# Patient Record
Sex: MALE | Race: WHITE
[De-identification: names, ages, dates, MRNs, and addresses within clinical notes are randomized per-mention and may not be internally consistent; named-entity substitution may affect disease eponyms.]

---

## 2017-05-21 ENCOUNTER — CHARTING TRANS (OUTPATIENT)
Dept: OTHER | Age: 52
End: 2017-05-21

## 2017-06-03 ENCOUNTER — CHARTING TRANS (OUTPATIENT)
Dept: OTHER | Age: 52
End: 2017-06-03

## 2017-07-14 ENCOUNTER — LAB ENCOUNTER (OUTPATIENT)
Dept: LAB | Age: 52
End: 2017-07-14
Attending: NURSE PRACTITIONER
Payer: COMMERCIAL

## 2017-07-14 ENCOUNTER — OFFICE VISIT (OUTPATIENT)
Dept: INTERNAL MEDICINE CLINIC | Facility: CLINIC | Age: 52
End: 2017-07-14

## 2017-07-14 VITALS
HEIGHT: 72 IN | WEIGHT: 213 LBS | BODY MASS INDEX: 28.85 KG/M2 | TEMPERATURE: 98 F | DIASTOLIC BLOOD PRESSURE: 68 MMHG | SYSTOLIC BLOOD PRESSURE: 110 MMHG | HEART RATE: 58 BPM

## 2017-07-14 DIAGNOSIS — Z13.0 SCREENING FOR BLOOD DISEASE: ICD-10-CM

## 2017-07-14 DIAGNOSIS — Z13.228 SCREENING FOR METABOLIC DISORDER: ICD-10-CM

## 2017-07-14 DIAGNOSIS — Z12.5 SCREENING FOR PROSTATE CANCER: ICD-10-CM

## 2017-07-14 DIAGNOSIS — Z13.29 SCREENING FOR THYROID DISORDER: ICD-10-CM

## 2017-07-14 DIAGNOSIS — Z12.11 SCREENING FOR COLON CANCER: ICD-10-CM

## 2017-07-14 DIAGNOSIS — Q23.1 BICUSPID AORTIC VALVE DETERMINED BY IMAGING: ICD-10-CM

## 2017-07-14 DIAGNOSIS — Z13.220 SCREENING FOR LIPID DISORDERS: ICD-10-CM

## 2017-07-14 DIAGNOSIS — Z00.00 ROUTINE PHYSICAL EXAMINATION: Primary | ICD-10-CM

## 2017-07-14 DIAGNOSIS — Q23.1 AORTIC INSUFFICIENCY DUE TO BICUSPID AORTIC VALVE: ICD-10-CM

## 2017-07-14 PROBLEM — Q23.81 AORTIC INSUFFICIENCY DUE TO BICUSPID AORTIC VALVE (HCC): Status: ACTIVE | Noted: 2017-07-14

## 2017-07-14 PROBLEM — I35.1 AORTIC VALVE REGURGITATION: Status: ACTIVE | Noted: 2017-07-14

## 2017-07-14 PROBLEM — Q23.81 BICUSPID AORTIC VALVE DETERMINED BY IMAGING: Status: ACTIVE | Noted: 2017-07-14

## 2017-07-14 LAB
ALBUMIN SERPL-MCNC: 4 G/DL (ref 3.5–4.8)
ALP LIVER SERPL-CCNC: 62 U/L (ref 45–117)
ALT SERPL-CCNC: 28 U/L (ref 17–63)
AST SERPL-CCNC: 12 U/L (ref 15–41)
BASOPHILS # BLD AUTO: 0.06 X10(3) UL (ref 0–0.1)
BASOPHILS NFR BLD AUTO: 1.2 %
BILIRUB SERPL-MCNC: 1.5 MG/DL (ref 0.1–2)
BUN BLD-MCNC: 15 MG/DL (ref 8–20)
CALCIUM BLD-MCNC: 9.3 MG/DL (ref 8.3–10.3)
CHLORIDE: 109 MMOL/L (ref 101–111)
CHOLEST SMN-MCNC: 208 MG/DL (ref ?–200)
CO2: 27 MMOL/L (ref 22–32)
CREAT BLD-MCNC: 1.08 MG/DL (ref 0.7–1.3)
EOSINOPHIL # BLD AUTO: 0.12 X10(3) UL (ref 0–0.3)
EOSINOPHIL NFR BLD AUTO: 2.3 %
ERYTHROCYTE [DISTWIDTH] IN BLOOD BY AUTOMATED COUNT: 13.7 % (ref 11.5–16)
GLUCOSE BLD-MCNC: 104 MG/DL (ref 70–99)
HCT VFR BLD AUTO: 46 % (ref 37–53)
HDLC SERPL-MCNC: 56 MG/DL (ref 45–?)
HDLC SERPL: 3.71 {RATIO} (ref ?–4.97)
HGB BLD-MCNC: 15.3 G/DL (ref 13–17)
IMMATURE GRANULOCYTE COUNT: 0.02 X10(3) UL (ref 0–1)
IMMATURE GRANULOCYTE RATIO %: 0.4 %
LDLC SERPL CALC-MCNC: 128 MG/DL (ref ?–130)
LYMPHOCYTES # BLD AUTO: 1.81 X10(3) UL (ref 0.9–4)
LYMPHOCYTES NFR BLD AUTO: 35.1 %
M PROTEIN MFR SERPL ELPH: 7 G/DL (ref 6.1–8.3)
MCH RBC QN AUTO: 29.4 PG (ref 27–33.2)
MCHC RBC AUTO-ENTMCNC: 33.3 G/DL (ref 31–37)
MCV RBC AUTO: 88.3 FL (ref 80–99)
MONOCYTES # BLD AUTO: 0.44 X10(3) UL (ref 0.1–0.6)
MONOCYTES NFR BLD AUTO: 8.5 %
NEUTROPHIL ABS PRELIM: 2.71 X10 (3) UL (ref 1.3–6.7)
NEUTROPHILS # BLD AUTO: 2.71 X10(3) UL (ref 1.3–6.7)
NEUTROPHILS NFR BLD AUTO: 52.5 %
NONHDLC SERPL-MCNC: 152 MG/DL (ref ?–130)
PLATELET # BLD AUTO: 202 10(3)UL (ref 150–450)
POTASSIUM SERPL-SCNC: 5.2 MMOL/L (ref 3.6–5.1)
PSA SERPL-MCNC: 0.75 NG/ML (ref 0.01–4)
RBC # BLD AUTO: 5.21 X10(6)UL (ref 4.3–5.7)
RED CELL DISTRIBUTION WIDTH-SD: 44.4 FL (ref 35.1–46.3)
SODIUM SERPL-SCNC: 142 MMOL/L (ref 136–144)
TRIGLYCERIDES: 119 MG/DL (ref ?–150)
TSI SER-ACNC: 0.41 MIU/ML (ref 0.35–5.5)
VLDL: 24 MG/DL (ref 5–40)
WBC # BLD AUTO: 5.2 X10(3) UL (ref 4–13)

## 2017-07-14 PROCEDURE — 84153 ASSAY OF PSA TOTAL: CPT | Performed by: NURSE PRACTITIONER

## 2017-07-14 PROCEDURE — 80061 LIPID PANEL: CPT | Performed by: NURSE PRACTITIONER

## 2017-07-14 PROCEDURE — 99386 PREV VISIT NEW AGE 40-64: CPT | Performed by: NURSE PRACTITIONER

## 2017-07-14 PROCEDURE — 80050 GENERAL HEALTH PANEL: CPT | Performed by: NURSE PRACTITIONER

## 2017-07-14 RX ORDER — ATORVASTATIN CALCIUM 10 MG/1
10 TABLET, FILM COATED ORAL NIGHTLY
Qty: 30 TABLET | Refills: 2 | Status: SHIPPED | OUTPATIENT
Start: 2017-07-14 | End: 2018-05-02

## 2017-07-20 DIAGNOSIS — E87.5 HYPERKALEMIA: Primary | ICD-10-CM

## 2017-08-08 ENCOUNTER — APPOINTMENT (OUTPATIENT)
Dept: LAB | Age: 52
End: 2017-08-08
Attending: NURSE PRACTITIONER
Payer: COMMERCIAL

## 2017-08-08 DIAGNOSIS — E87.5 HYPERKALEMIA: ICD-10-CM

## 2017-08-08 LAB
BUN BLD-MCNC: 15 MG/DL (ref 8–20)
CALCIUM BLD-MCNC: 9.3 MG/DL (ref 8.3–10.3)
CHLORIDE: 108 MMOL/L (ref 101–111)
CO2: 27 MMOL/L (ref 22–32)
CREAT BLD-MCNC: 1.1 MG/DL (ref 0.7–1.3)
GLUCOSE BLD-MCNC: 97 MG/DL (ref 70–99)
POTASSIUM SERPL-SCNC: 4.1 MMOL/L (ref 3.6–5.1)
SODIUM SERPL-SCNC: 140 MMOL/L (ref 136–144)

## 2017-08-08 PROCEDURE — 80048 BASIC METABOLIC PNL TOTAL CA: CPT | Performed by: NURSE PRACTITIONER

## 2017-08-08 PROCEDURE — 36415 COLL VENOUS BLD VENIPUNCTURE: CPT | Performed by: NURSE PRACTITIONER

## 2017-08-17 ENCOUNTER — HOSPITAL ENCOUNTER (OUTPATIENT)
Dept: CV DIAGNOSTICS | Facility: HOSPITAL | Age: 52
Discharge: HOME | End: 2017-08-17
Attending: NURSE PRACTITIONER
Payer: COMMERCIAL

## 2017-08-17 ENCOUNTER — TELEPHONE (OUTPATIENT)
Dept: INTERNAL MEDICINE CLINIC | Facility: CLINIC | Age: 52
End: 2017-08-17

## 2017-08-17 DIAGNOSIS — Q23.1 AORTIC INSUFFICIENCY DUE TO BICUSPID AORTIC VALVE: ICD-10-CM

## 2017-08-17 DIAGNOSIS — Q23.1 BICUSPID AORTIC VALVE DETERMINED BY IMAGING: ICD-10-CM

## 2017-08-17 PROCEDURE — 93306 TTE W/DOPPLER COMPLETE: CPT | Performed by: NURSE PRACTITIONER

## 2017-08-17 NOTE — TELEPHONE ENCOUNTER
Patient had a 2D Doppler today and states he needs the results of this to be faxed to [facility] by 8/27.

## 2017-08-22 ENCOUNTER — TELEPHONE (OUTPATIENT)
Dept: INTERNAL MEDICINE CLINIC | Facility: CLINIC | Age: 52
End: 2017-08-22

## 2017-10-02 ENCOUNTER — MED REC SCAN ONLY (OUTPATIENT)
Dept: INTERNAL MEDICINE CLINIC | Facility: CLINIC | Age: 52
End: 2017-10-02

## 2017-12-31 ENCOUNTER — HOSPITAL ENCOUNTER (EMERGENCY)
Facility: HOSPITAL | Age: 52
Discharge: HOME | End: 2017-12-31
Attending: EMERGENCY MEDICINE
Payer: COMMERCIAL

## 2017-12-31 VITALS
SYSTOLIC BLOOD PRESSURE: 127 MMHG | RESPIRATION RATE: 16 BRPM | BODY MASS INDEX: 27.77 KG/M2 | HEIGHT: 72 IN | OXYGEN SATURATION: 98 % | WEIGHT: 205 LBS | TEMPERATURE: 97 F | DIASTOLIC BLOOD PRESSURE: 79 MMHG | HEART RATE: 66 BPM

## 2017-12-31 DIAGNOSIS — S01.81XA FACIAL LACERATION, INITIAL ENCOUNTER: ICD-10-CM

## 2017-12-31 DIAGNOSIS — S09.90XA INJURY OF HEAD, INITIAL ENCOUNTER: Primary | ICD-10-CM

## 2017-12-31 PROCEDURE — 90471 IMMUNIZATION ADMIN: CPT

## 2017-12-31 PROCEDURE — 99283 EMERGENCY DEPT VISIT LOW MDM: CPT

## 2017-12-31 PROCEDURE — 12011 RPR F/E/E/N/L/M 2.5 CM/<: CPT

## 2017-12-31 NOTE — ED NOTES
Sterile dressing with neosporin applied, pct did asepsis to wound prio to suturing, pt refusing ct suggested by md, pt has no complaints, a/o x3, bleeding controlled, vss

## 2017-12-31 NOTE — ED INITIAL ASSESSMENT (HPI)
Pt presents with lac to L eyebrow, felt dizzy, slipped in garage hitting head on compreeor, dizziness passed after few seconds, wife stated that pt was tremoring and disoriented needing to sit down

## 2017-12-31 NOTE — ED PROVIDER NOTES
Patient Seen in: [facility] Emergency Department    History   Patient presents with:  Contusion (musculoskeletal)    Stated Complaint: fall    HPI    58-year-old male comes in the hospital she complained of having a laceration to left eyebrow.   The neck supple, no JVD, trachea midline, No LAD  Heart: S1S2 normal. No murmurs, regular rate and rhythm  Lungs: Clear to auscultation bilaterally  Abdomen: Soft nontender nondistended normal active bowel sounds without rebound, guarding or masses noted  Back

## 2018-01-07 ENCOUNTER — HOSPITAL ENCOUNTER (EMERGENCY)
Facility: HOSPITAL | Age: 53
Discharge: HOME | End: 2018-01-07
Attending: EMERGENCY MEDICINE
Payer: COMMERCIAL

## 2018-01-07 VITALS
SYSTOLIC BLOOD PRESSURE: 118 MMHG | HEART RATE: 75 BPM | RESPIRATION RATE: 16 BRPM | DIASTOLIC BLOOD PRESSURE: 70 MMHG | WEIGHT: 205 LBS | BODY MASS INDEX: 27.77 KG/M2 | TEMPERATURE: 98 F | HEIGHT: 72 IN | OXYGEN SATURATION: 97 %

## 2018-01-07 DIAGNOSIS — Z48.02 ENCOUNTER FOR REMOVAL OF SUTURES: Primary | ICD-10-CM

## 2018-01-07 NOTE — ED INITIAL ASSESSMENT (HPI)
Pt was seen here 1 week ago and had mechanical fall obtaining small laceration to L orbit. Pt had 4 sutures placed and was instructed to have them removed in 7 days. Pt here for suture removal. Denies any other complaints.  Wound appears to be healing well

## 2018-01-07 NOTE — ED PROVIDER NOTES
Patient Seen in: [facility] Emergency Department    History   Patient presents with:  Astrid Diaz (ingtegumentary)    Stated Complaint: SUTURE REMOVAL    HPI    Patient reports for suture removal.  Patient had sutures placed on New Year's E 1455  ------------------------------------------------------------       MDM   4 sutures were removed. 1 of the sutures was difficult to remove and I was assisted by my colleague. The wound edges were noted to be well approximated.   There is an abrasion

## 2018-05-02 ENCOUNTER — OFFICE VISIT (OUTPATIENT)
Dept: FAMILY MEDICINE CLINIC | Facility: CLINIC | Age: 53
End: 2018-05-02

## 2018-05-02 VITALS
HEIGHT: 72 IN | DIASTOLIC BLOOD PRESSURE: 72 MMHG | BODY MASS INDEX: 28.85 KG/M2 | RESPIRATION RATE: 18 BRPM | WEIGHT: 213 LBS | OXYGEN SATURATION: 98 % | HEART RATE: 74 BPM | SYSTOLIC BLOOD PRESSURE: 126 MMHG | TEMPERATURE: 98 F

## 2018-05-02 DIAGNOSIS — J06.9 VIRAL URI: Primary | ICD-10-CM

## 2018-05-02 PROCEDURE — 99213 OFFICE O/P EST LOW 20 MIN: CPT | Performed by: NURSE PRACTITIONER

## 2018-05-02 RX ORDER — FLUTICASONE PROPIONATE 50 MCG
2 SPRAY, SUSPENSION (ML) NASAL DAILY
Qty: 1 BOTTLE | Refills: 0 | Status: SHIPPED | OUTPATIENT
Start: 2018-05-02 | End: 2019-08-06

## 2018-05-02 RX ORDER — AMOXICILLIN AND CLAVULANATE POTASSIUM 875; 125 MG/1; MG/1
1 TABLET, FILM COATED ORAL 2 TIMES DAILY
Qty: 20 TABLET | Refills: 0 | Status: SHIPPED | OUTPATIENT
Start: 2018-05-02 | End: 2018-05-12

## 2018-05-02 NOTE — PROGRESS NOTES
CHIEF COMPLAINT:   Patient presents with:  Sinus Problem: sinus congestion, cough, sore throat x4 days (no meds taken)      HPI:   Wendi Green is a 48year old male who presents for upper respiratory symptoms for  4 days.  Patient reports sore throat, conge THROAT: Oral mucosa pink, moist. Posterior pharynx is starr erythematous. no exudates. Tonsils 1/4. +PND. NECK: Supple, non-tender  LUNGS: clear to auscultation bilaterally, no wheezes or rhonchi. Breathing is non labored.   CARDIO: RRR without murmur  EX You have a viral upper respiratory illness (URI), which is another term for the common cold. This illness is contagious during the first few days. It is spread through the air by coughing and sneezing.  It may also be spread by direct contact (touching the · Cough with lots of colored sputum (mucus)  · Severe headache; face, neck, or ear pain  · Difficulty swallowing due to throat pain  · Fever of 100.4°F (38°C) or higher, or as directed by your healthcare provider  Call 911  Call 911 if any of these occur: · Over-the-counter decongestants may be used unless a similar medicine was prescribed. Nasal sprays work the fastest. Use one that contains phenylephrine or oxymetazoline. First blow the nose gently. Then use the spray.  Do not use these medicines more ofte © 4346-3734 The [address] [facility], [facility] [place]. All rights reserved. This information is not intended as a substitute for professional medical care. Always follow your healthcare professional's instructions.             The

## 2018-05-02 NOTE — PATIENT INSTRUCTIONS
Take the nasal spray as prescribed. You can also try over the counter mucinex for congestion. Try warm salt water gargles for sore throat. Fill the antibiotic prescription only if your symptoms do not improve after another 4-5 days.    Viral Upper Respira · Over-the-counter cold medicines will not shorten the length of time you’re sick, but they may be helpful for the following symptoms: cough, sore throat, and nasal and sinus congestion.  (Note: Do not use decongestants if you have high blood pressure.)  Fo · Heat may help soothe painful areas of the face. Use a towel soaked in hot water. Or,  the shower and direct the hot spray onto your face.  Using a vaporizer along with a menthol rub at night may also help.   · An expectorant containing guaifenesin · Vision problems, including blurred or double vision  · Fever of 100.4ºF (38ºC) or higher, or as directed by your healthcare provider  · Seizure  · Breathing problems  · Symptoms not resolving within 10 days  Date Last Reviewed: 4/13/2015  © 6344-7827 The

## 2018-09-06 ENCOUNTER — OFFICE VISIT (OUTPATIENT)
Dept: FAMILY MEDICINE CLINIC | Facility: CLINIC | Age: 53
End: 2018-09-06
Payer: COMMERCIAL

## 2018-09-06 VITALS
BODY MASS INDEX: 29 KG/M2 | HEART RATE: 87 BPM | WEIGHT: 211 LBS | RESPIRATION RATE: 16 BRPM | OXYGEN SATURATION: 98 % | SYSTOLIC BLOOD PRESSURE: 120 MMHG | DIASTOLIC BLOOD PRESSURE: 80 MMHG | TEMPERATURE: 99 F

## 2018-09-06 DIAGNOSIS — J06.9 URI, ACUTE: Primary | ICD-10-CM

## 2018-09-06 DIAGNOSIS — B34.9 ACUTE VIRAL SYNDROME: ICD-10-CM

## 2018-09-06 PROCEDURE — 99213 OFFICE O/P EST LOW 20 MIN: CPT | Performed by: NURSE PRACTITIONER

## 2018-09-09 NOTE — PROGRESS NOTES
Patient presents with:  URI: post nasal drip and cough sx x 2-3 days. HPI:   Danika Ospina is a 48year old male who presents for upper respiratory symptoms for  2  days.  Patient reports sore throat only at the beginning of sx's, congestion, clear colo auscultation  CARDIO: RRR without murmur  GI: good BS's,no masses, HSM or tenderness    ASSESSMENT AND PLAN:   Michelle Young is a 48year old male who presents with:    Nunez Pavy, acute  (primary encounter diagnosis)  Acute viral syndrome      Meds & Refills for t

## 2018-09-17 ENCOUNTER — OFFICE VISIT (OUTPATIENT)
Dept: INTERNAL MEDICINE CLINIC | Facility: CLINIC | Age: 53
End: 2018-09-17
Payer: COMMERCIAL

## 2018-09-17 VITALS
SYSTOLIC BLOOD PRESSURE: 120 MMHG | HEIGHT: 72 IN | HEART RATE: 104 BPM | DIASTOLIC BLOOD PRESSURE: 70 MMHG | WEIGHT: 212 LBS | RESPIRATION RATE: 16 BRPM | OXYGEN SATURATION: 96 % | TEMPERATURE: 97 F | BODY MASS INDEX: 28.71 KG/M2

## 2018-09-17 DIAGNOSIS — J06.9 ACUTE URI: Primary | ICD-10-CM

## 2018-09-17 DIAGNOSIS — R05.9 COUGH: ICD-10-CM

## 2018-09-17 PROCEDURE — 99213 OFFICE O/P EST LOW 20 MIN: CPT | Performed by: NURSE PRACTITIONER

## 2018-09-17 RX ORDER — CODEINE PHOSPHATE AND GUAIFENESIN 10; 100 MG/5ML; MG/5ML
10 SOLUTION ORAL NIGHTLY PRN
Qty: 240 ML | Refills: 0 | Status: SHIPPED | OUTPATIENT
Start: 2018-09-17 | End: 2018-09-28

## 2018-09-17 RX ORDER — AMOXICILLIN AND CLAVULANATE POTASSIUM 875; 125 MG/1; MG/1
1 TABLET, FILM COATED ORAL 2 TIMES DAILY
Qty: 20 TABLET | Refills: 0 | Status: SHIPPED | OUTPATIENT
Start: 2018-09-17 | End: 2018-09-27

## 2018-09-17 NOTE — PROGRESS NOTES
Denisse Teixeira is a 48year old male. Patient presents with:  Cold: patient c/o headaches, cough attacks x 1 week      HPI:   Presents for eval of cough and nasal congestion. Started 1 week ago. Yellow green nasal discharge and coughing up same.   No fever 96%   BMI 28.75 kg/m²   GENERAL: well developed, well nourished,in no apparent distress  HEENT: atraumatic, normocephalic,pharyngeal erythema without exudate. Maxillary and frontal sinus tenderness.     NECK: supple,no adenopathy,  LUNGS: normal rate witho

## 2018-09-26 NOTE — TELEPHONE ENCOUNTER
LOV:9/17/18 SD  FOV:none on file   LAST RX:9/17/18 100-10 mg/5ml take 10 ml nightly as needed for cough 240 ml 0 refills   LAST LABS:8/8/17 bmp  PER PROTOCOL: to provider

## 2018-09-28 RX ORDER — CODEINE PHOSPHATE AND GUAIFENESIN 10; 100 MG/5ML; MG/5ML
10 SOLUTION ORAL NIGHTLY PRN
Qty: 120 ML | Refills: 0 | Status: SHIPPED | OUTPATIENT
Start: 2018-09-28 | End: 2018-11-27

## 2018-09-28 RX ORDER — OXYBUTYNIN CHLORIDE 5 MG/1
TABLET ORAL
Qty: 240 ML | Refills: 0 | OUTPATIENT
Start: 2018-09-28

## 2018-09-28 NOTE — TELEPHONE ENCOUNTER
Spoke with patient states continues to experience coughing along with minimal yellow green nasal discharge, post nasal drip, headaches behind right eye when coughing only. No fevers, No chills, No other symptoms at this time.  Patient completed antibiotic c

## 2018-09-28 NOTE — TELEPHONE ENCOUNTER
Spoke with patient informed SD sent rx for guaifenesin-codeine supply for the weekend, if he is not feeling better next week, will need to come back for OV. Patient verbalized understanding and agreeable to POC.    Rx faxed to [facility] pharmacy on file, fa

## 2018-11-03 VITALS
HEART RATE: 52 BPM | DIASTOLIC BLOOD PRESSURE: 78 MMHG | WEIGHT: 205 LBS | HEIGHT: 72 IN | SYSTOLIC BLOOD PRESSURE: 124 MMHG | TEMPERATURE: 98.3 F | BODY MASS INDEX: 27.77 KG/M2 | RESPIRATION RATE: 16 BRPM

## 2018-11-03 VITALS
HEART RATE: 84 BPM | SYSTOLIC BLOOD PRESSURE: 130 MMHG | WEIGHT: 205 LBS | DIASTOLIC BLOOD PRESSURE: 82 MMHG | TEMPERATURE: 98.1 F | RESPIRATION RATE: 16 BRPM

## 2018-11-27 ENCOUNTER — MED REC SCAN ONLY (OUTPATIENT)
Dept: FAMILY MEDICINE CLINIC | Facility: CLINIC | Age: 53
End: 2018-11-27

## 2018-11-27 ENCOUNTER — OFFICE VISIT (OUTPATIENT)
Dept: FAMILY MEDICINE CLINIC | Facility: CLINIC | Age: 53
End: 2018-11-27
Payer: COMMERCIAL

## 2018-11-27 VITALS
TEMPERATURE: 98 F | OXYGEN SATURATION: 98 % | HEIGHT: 72 IN | BODY MASS INDEX: 28.71 KG/M2 | SYSTOLIC BLOOD PRESSURE: 122 MMHG | RESPIRATION RATE: 16 BRPM | WEIGHT: 212 LBS | DIASTOLIC BLOOD PRESSURE: 80 MMHG | HEART RATE: 86 BPM

## 2018-11-27 DIAGNOSIS — J01.00 ACUTE NON-RECURRENT MAXILLARY SINUSITIS: ICD-10-CM

## 2018-11-27 DIAGNOSIS — H10.32 ACUTE BACTERIAL CONJUNCTIVITIS OF LEFT EYE: Primary | ICD-10-CM

## 2018-11-27 PROCEDURE — 99213 OFFICE O/P EST LOW 20 MIN: CPT | Performed by: NURSE PRACTITIONER

## 2018-11-27 RX ORDER — TOBRAMYCIN 3 MG/ML
SOLUTION/ DROPS OPHTHALMIC
Qty: 10 ML | Refills: 1 | Status: SHIPPED | OUTPATIENT
Start: 2018-11-27 | End: 2019-08-06 | Stop reason: ALTCHOICE

## 2018-11-27 RX ORDER — AMOXICILLIN AND CLAVULANATE POTASSIUM 875; 125 MG/1; MG/1
1 TABLET, FILM COATED ORAL 2 TIMES DAILY
Qty: 20 TABLET | Refills: 0 | Status: SHIPPED | OUTPATIENT
Start: 2018-11-27 | End: 2018-11-27

## 2018-11-27 RX ORDER — AMOXICILLIN AND CLAVULANATE POTASSIUM 875; 125 MG/1; MG/1
1 TABLET, FILM COATED ORAL 2 TIMES DAILY
Qty: 20 TABLET | Refills: 0 | Status: SHIPPED | OUTPATIENT
Start: 2018-11-27 | End: 2018-12-07

## 2018-11-27 NOTE — PROGRESS NOTES
CHIEF COMPLAINT:   \" Patient presents with:  Eye Problem: left eye redness,painful,discharge and change in vision sx x 1 day. Vision R.20/25  L.20/20 w/o aid. HPI:   Andra Gonzales is a 48year old male who presents with a hx of a Sinus infection.   Pt w Family History   Problem Relation Age of Onset   • Hypertension Father    • Heart Disorder Paternal Grandmother    • Heart Disorder Paternal Grandfather       Social History    Tobacco Use      Smoking status: Current Some Day Smoker        Types: Cigars Acute bacterial conjunctivitis of left eye    - Tobramycin Sulfate 0.3 % Ophthalmic Solution;  Instill 2 drops to affected eye(s) every 2 hours while awake today, then instill 2 drops to affected eye(s) four times a day for 6 more days  Dispense: 10 mL; Ref

## 2019-08-06 ENCOUNTER — OFFICE VISIT (OUTPATIENT)
Dept: INTERNAL MEDICINE CLINIC | Facility: CLINIC | Age: 54
End: 2019-08-06
Payer: COMMERCIAL

## 2019-08-06 VITALS
HEART RATE: 55 BPM | BODY MASS INDEX: 28.17 KG/M2 | DIASTOLIC BLOOD PRESSURE: 74 MMHG | SYSTOLIC BLOOD PRESSURE: 106 MMHG | TEMPERATURE: 98 F | RESPIRATION RATE: 16 BRPM | WEIGHT: 208 LBS | HEIGHT: 72 IN

## 2019-08-06 DIAGNOSIS — M62.830 LUMBAR PARASPINAL MUSCLE SPASM: Primary | ICD-10-CM

## 2019-08-06 DIAGNOSIS — M54.16 LUMBAR RADICULOPATHY: ICD-10-CM

## 2019-08-06 PROCEDURE — 99213 OFFICE O/P EST LOW 20 MIN: CPT | Performed by: INTERNAL MEDICINE

## 2019-08-06 RX ORDER — HYDROCODONE BITARTRATE AND ACETAMINOPHEN 5; 325 MG/1; MG/1
1 TABLET ORAL EVERY 8 HOURS PRN
Qty: 5 TABLET | Refills: 0 | Status: SHIPPED | OUTPATIENT
Start: 2019-08-06 | End: 2019-10-30 | Stop reason: ALTCHOICE

## 2019-08-06 RX ORDER — CYCLOBENZAPRINE HCL 10 MG
10 TABLET ORAL 2 TIMES DAILY PRN
Qty: 20 TABLET | Refills: 1 | Status: SHIPPED | OUTPATIENT
Start: 2019-08-06 | End: 2019-08-16

## 2019-08-06 RX ORDER — METHYLPREDNISOLONE 4 MG/1
TABLET ORAL
Qty: 1 KIT | Refills: 0 | Status: SHIPPED | OUTPATIENT
Start: 2019-08-06 | End: 2019-10-30 | Stop reason: ALTCHOICE

## 2019-08-06 NOTE — PROGRESS NOTES
Sana Marlow  2/6/1965    Patient presents with:  Back Pain: x 1 wk, mid back, believes to have \"aggrivated\" it      SUBJECTIVE   Sana Marlow is a 47year old male who presents with lower back pain.     The patient describes a 7-day history of bilateral lo Binge frequency: Never      Comment: CAGE 8/6/19    Drug use: No        OBJECTIVE:   /74 (BP Location: Right arm, Patient Position: Sitting, Cuff Size: adult)   Pulse 55   Temp 98 °F (36.7 °C) (Oral)   Resp 16   Ht 72\"   Wt 208 lb   BMI 28.21 kg/m therapy  We will hold imaging at this time given lack of trauma    The patient indicates understanding of these issues and agrees to the plan. The patient is asked to return or present to the emergency room for worsening of symptoms.     TODAY'S ORDERS

## 2019-10-16 ENCOUNTER — TELEPHONE (OUTPATIENT)
Dept: INTERNAL MEDICINE CLINIC | Facility: CLINIC | Age: 54
End: 2019-10-16

## 2019-10-16 DIAGNOSIS — Z12.5 SCREENING FOR MALIGNANT NEOPLASM OF PROSTATE: ICD-10-CM

## 2019-10-16 DIAGNOSIS — Z13.29 SCREENING FOR THYROID DISORDER: ICD-10-CM

## 2019-10-16 DIAGNOSIS — Z13.0 SCREENING FOR DISORDER OF BLOOD AND BLOOD-FORMING ORGANS: ICD-10-CM

## 2019-10-16 DIAGNOSIS — Z13.220 SCREENING FOR LIPID DISORDERS: ICD-10-CM

## 2019-10-16 DIAGNOSIS — Z00.00 ROUTINE GENERAL MEDICAL EXAMINATION AT A HEALTH CARE FACILITY: Primary | ICD-10-CM

## 2019-10-16 DIAGNOSIS — Z13.228 SCREENING FOR METABOLIC DISORDER: ICD-10-CM

## 2019-10-16 NOTE — TELEPHONE ENCOUNTER
Future Appointments   Date Time Provider Jacques Hidalgo   10/30/2019  8:00 AM Bridgett Murray, APRN [facility]     Pt would like fasting BW orders sent to [facility].

## 2019-10-18 ENCOUNTER — TELEPHONE (OUTPATIENT)
Dept: INTERNAL MEDICINE CLINIC | Facility: CLINIC | Age: 54
End: 2019-10-18

## 2019-10-18 NOTE — TELEPHONE ENCOUNTER
Pt dropped off forms for foster license. Please contact pt upon completion for . Pt is scheduled for date below, can pick it up then as well. Please advise. Will place copy in front folder and one in the back.     Future Appointments   Date Time

## 2019-10-21 NOTE — TELEPHONE ENCOUNTER
Last ov 9/2018  Last CPE 2017  Will not be able to complete forms until after CPE.   Please inform patient they will not be completed for him to take with at CPE

## 2019-10-30 ENCOUNTER — OFFICE VISIT (OUTPATIENT)
Dept: INTERNAL MEDICINE CLINIC | Facility: CLINIC | Age: 54
End: 2019-10-30
Payer: COMMERCIAL

## 2019-10-30 VITALS
SYSTOLIC BLOOD PRESSURE: 102 MMHG | BODY MASS INDEX: 28.04 KG/M2 | DIASTOLIC BLOOD PRESSURE: 60 MMHG | WEIGHT: 207 LBS | HEART RATE: 68 BPM | TEMPERATURE: 98 F | HEIGHT: 72 IN

## 2019-10-30 DIAGNOSIS — Z23 NEEDS FLU SHOT: ICD-10-CM

## 2019-10-30 DIAGNOSIS — Q23.1 BICUSPID AORTIC VALVE DETERMINED BY IMAGING: ICD-10-CM

## 2019-10-30 DIAGNOSIS — Z00.00 ROUTINE GENERAL MEDICAL EXAMINATION AT A HEALTH CARE FACILITY: Primary | ICD-10-CM

## 2019-10-30 DIAGNOSIS — Q23.1 AORTIC INSUFFICIENCY DUE TO BICUSPID AORTIC VALVE: ICD-10-CM

## 2019-10-30 PROBLEM — H10.32 ACUTE BACTERIAL CONJUNCTIVITIS OF LEFT EYE: Status: RESOLVED | Noted: 2018-11-27 | Resolved: 2019-10-30

## 2019-10-30 PROCEDURE — 86580 TB INTRADERMAL TEST: CPT | Performed by: NURSE PRACTITIONER

## 2019-10-30 PROCEDURE — 90686 IIV4 VACC NO PRSV 0.5 ML IM: CPT | Performed by: NURSE PRACTITIONER

## 2019-10-30 PROCEDURE — 99396 PREV VISIT EST AGE 40-64: CPT | Performed by: NURSE PRACTITIONER

## 2019-10-30 PROCEDURE — 90471 IMMUNIZATION ADMIN: CPT | Performed by: NURSE PRACTITIONER

## 2019-10-30 NOTE — PROGRESS NOTES
Robin Tran is a 47year old male who presents for a complete physical exam.     HPI:   Pt complains of . 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 Feeling good overall.     Noted with prior hi Alcohol/week: 0.0 standard drinks      Frequency: Monthly or less      Drinks per session: 1 or 2      Binge frequency: Never      Comment: CAGE 8/6/19    Drug use: No     Social History: Occ: working . : yes . [employer]cd[employer]   Exercise: minimal.  Diet: watches mi descended testes,no masses,no hernia,no penile lesions  RECTAL: prostate not enlarged.  no mass  MUSCULOSKELETAL: back is not tender  EXTREMITIES: no edema  NEURO: Oriented times three,cranial nerves are intact,motor and sensory are grossly intact    ASSESS

## 2019-11-01 ENCOUNTER — NURSE ONLY (OUTPATIENT)
Dept: INTERNAL MEDICINE CLINIC | Facility: CLINIC | Age: 54
End: 2019-11-01
Payer: COMMERCIAL

## 2019-11-01 ENCOUNTER — LAB ENCOUNTER (OUTPATIENT)
Dept: LAB | Age: 54
End: 2019-11-01
Attending: NURSE PRACTITIONER
Payer: COMMERCIAL

## 2019-11-01 DIAGNOSIS — Z12.5 SCREENING FOR MALIGNANT NEOPLASM OF PROSTATE: ICD-10-CM

## 2019-11-01 DIAGNOSIS — Z13.29 SCREENING FOR THYROID DISORDER: ICD-10-CM

## 2019-11-01 DIAGNOSIS — R73.9 HYPERGLYCEMIA: ICD-10-CM

## 2019-11-01 DIAGNOSIS — Z13.0 SCREENING FOR DISORDER OF BLOOD AND BLOOD-FORMING ORGANS: ICD-10-CM

## 2019-11-01 DIAGNOSIS — R73.9 HYPERGLYCEMIA: Primary | ICD-10-CM

## 2019-11-01 DIAGNOSIS — Z13.220 SCREENING FOR LIPID DISORDERS: ICD-10-CM

## 2019-11-01 DIAGNOSIS — Z13.228 SCREENING FOR METABOLIC DISORDER: ICD-10-CM

## 2019-11-01 DIAGNOSIS — Z00.00 ROUTINE GENERAL MEDICAL EXAMINATION AT A HEALTH CARE FACILITY: ICD-10-CM

## 2019-11-01 PROCEDURE — 80050 GENERAL HEALTH PANEL: CPT | Performed by: NURSE PRACTITIONER

## 2019-11-01 PROCEDURE — 80061 LIPID PANEL: CPT | Performed by: NURSE PRACTITIONER

## 2019-11-01 PROCEDURE — 84153 ASSAY OF PSA TOTAL: CPT | Performed by: NURSE PRACTITIONER

## 2019-11-01 PROCEDURE — 83036 HEMOGLOBIN GLYCOSYLATED A1C: CPT | Performed by: NURSE PRACTITIONER

## 2019-11-04 ENCOUNTER — TELEPHONE (OUTPATIENT)
Dept: INTERNAL MEDICINE CLINIC | Facility: CLINIC | Age: 54
End: 2019-11-04

## 2019-11-04 NOTE — TELEPHONE ENCOUNTER
Result Notes for COMP METABOLIC PANEL (14)     Notes recorded by Reda Rinne, APRN on 11/3/2019 at 8:46 AM CST  Prediabetes with high chol.  Ov to follow up and discuss.   ------    Notes recorded by Reda Rinne, APRN on 11/1/2019 at 3:33 PM CDT  Hyperg

## 2019-12-13 ENCOUNTER — HOSPITAL ENCOUNTER (OUTPATIENT)
Dept: CV DIAGNOSTICS | Facility: HOSPITAL | Age: 54
Discharge: HOME | End: 2019-12-13
Attending: NURSE PRACTITIONER
Payer: COMMERCIAL

## 2019-12-13 DIAGNOSIS — Q23.1 BICUSPID AORTIC VALVE DETERMINED BY IMAGING: ICD-10-CM

## 2019-12-13 DIAGNOSIS — Q23.1 AORTIC INSUFFICIENCY DUE TO BICUSPID AORTIC VALVE: ICD-10-CM

## 2019-12-13 PROCEDURE — 93306 TTE W/DOPPLER COMPLETE: CPT | Performed by: NURSE PRACTITIONER

## 2020-01-21 NOTE — TELEPHONE ENCOUNTER
Pt had appt today with SD but it was cancelled due to SD being out of the office.  Pt states that he does not want to r/s due to his work schedule and would like a call back to get his results over the phone     Please advise

## 2020-11-10 ENCOUNTER — OFFICE VISIT (OUTPATIENT)
Dept: INTERNAL MEDICINE CLINIC | Facility: CLINIC | Age: 55
End: 2020-11-10
Payer: COMMERCIAL

## 2020-11-10 VITALS
BODY MASS INDEX: 28 KG/M2 | DIASTOLIC BLOOD PRESSURE: 78 MMHG | HEART RATE: 70 BPM | WEIGHT: 203.81 LBS | OXYGEN SATURATION: 96 % | TEMPERATURE: 97 F | SYSTOLIC BLOOD PRESSURE: 110 MMHG

## 2020-11-10 DIAGNOSIS — Z13.0 SCREENING FOR BLOOD DISEASE: ICD-10-CM

## 2020-11-10 DIAGNOSIS — E78.5 DYSLIPIDEMIA: ICD-10-CM

## 2020-11-10 DIAGNOSIS — Z13.220 SCREENING, LIPID: ICD-10-CM

## 2020-11-10 DIAGNOSIS — Z13.1 SCREENING FOR DIABETES MELLITUS: ICD-10-CM

## 2020-11-10 DIAGNOSIS — Z71.89 COUNSELED ABOUT COVID-19 VIRUS INFECTION: ICD-10-CM

## 2020-11-10 DIAGNOSIS — R73.03 PREDIABETES: ICD-10-CM

## 2020-11-10 DIAGNOSIS — Z13.29 SCREENING FOR THYROID DISORDER: ICD-10-CM

## 2020-11-10 DIAGNOSIS — M25.511 ACUTE PAIN OF RIGHT SHOULDER: Primary | ICD-10-CM

## 2020-11-10 PROCEDURE — 3074F SYST BP LT 130 MM HG: CPT | Performed by: NURSE PRACTITIONER

## 2020-11-10 PROCEDURE — 3078F DIAST BP <80 MM HG: CPT | Performed by: NURSE PRACTITIONER

## 2020-11-10 PROCEDURE — 99214 OFFICE O/P EST MOD 30 MIN: CPT | Performed by: NURSE PRACTITIONER

## 2020-11-10 PROCEDURE — 99072 ADDL SUPL MATRL&STAF TM PHE: CPT | Performed by: NURSE PRACTITIONER

## 2020-11-10 RX ORDER — HYDROCODONE BITARTRATE AND ACETAMINOPHEN 5; 325 MG/1; MG/1
1 TABLET ORAL 2 TIMES DAILY PRN
Qty: 14 TABLET | Refills: 0 | Status: SHIPPED | OUTPATIENT
Start: 2020-11-10 | End: 2020-11-17

## 2020-11-10 RX ORDER — IBUPROFEN 800 MG/1
800 TABLET ORAL EVERY 6 HOURS PRN
COMMUNITY

## 2020-11-10 NOTE — PROGRESS NOTES
Dwight Kwon is a 54year old male. Patient presents with:  Shoulder Pain: right shoulder pain. LM rm 10      HPI:   Presents for eval right shoulder pain. S/p fall 3 weeks ago onto right shoulder. Saw Dr Marty Hopson for evaluation. Xray done.   No acute fin • Hypertension Father    • Heart Disorder Paternal Grandmother    • Heart Disorder Paternal Grandfather         Allergies  No Known Allergies    REVIEW OF SYSTEMS:   GENERAL HEALTH: feels well otherwise  GI: denies abdominal pain and denies heartburn, no

## 2020-11-17 ENCOUNTER — TELEPHONE (OUTPATIENT)
Dept: INTERNAL MEDICINE CLINIC | Facility: CLINIC | Age: 55
End: 2020-11-17

## 2020-11-17 RX ORDER — HYDROCODONE BITARTRATE AND ACETAMINOPHEN 5; 325 MG/1; MG/1
1 TABLET ORAL NIGHTLY PRN
Qty: 7 TABLET | Refills: 0 | Status: SHIPPED | OUTPATIENT
Start: 2020-11-17 | End: 2020-12-09

## 2020-11-17 NOTE — TELEPHONE ENCOUNTER
Wife of pt, Cheli, on hipaa for routine info -  stated he has one tablet left of the below medication and wants a refill, as he still having a lot of pain and isn't sleeping at night.   Cheli stated he never takes anything for pain, so this is big deal

## 2020-11-17 NOTE — TELEPHONE ENCOUNTER
Discussed with patient at ov. He has seen Dr Johny Uribe and can return to him or see Blake Marie. We discussed PT which would be beneficial and he deferred. He opted to do exercises given to him by Dr Johny Uribe.     I will only fill 7 tabs of norco.  I discuss

## 2020-11-23 NOTE — TELEPHONE ENCOUNTER
Pt called back and stated that he did receive the medication and that he is going to call Dr. Emily Lawson office to get an appt with him.      Pt is asking if more pain medication can be sent in for him     Please advise

## 2020-11-23 NOTE — TELEPHONE ENCOUNTER
Message was taken at 11 am.   There is no appt scheduled at this time. Please ask patient if he has scheduled.

## 2020-11-24 RX ORDER — HYDROCODONE BITARTRATE AND ACETAMINOPHEN 5; 325 MG/1; MG/1
1 TABLET ORAL DAILY PRN
Qty: 20 TABLET | Refills: 0 | Status: SHIPPED | OUTPATIENT
Start: 2020-11-24 | End: 2021-04-01

## 2020-11-24 NOTE — TELEPHONE ENCOUNTER
Patient states scheduled with Dr. Abel Lino 12/9/2020. Patient states will be out of [facility] 11/30-12-8 or 12-9. Patient asking for refill of norco as the previous prescription is gone. Patient states using once daily as indicated on prescription.  Patient as

## 2020-11-25 RX ORDER — HYDROCODONE BITARTRATE AND ACETAMINOPHEN 5; 325 MG/1; MG/1
1 TABLET ORAL NIGHTLY PRN
Qty: 7 TABLET | Refills: 0 | OUTPATIENT
Start: 2020-11-25

## 2020-11-25 NOTE — TELEPHONE ENCOUNTER
Denied refill due to already sent over to pharmacy. Latest RX: HYDROcodone-acetaminophen (NORCO) 5-325 MG Oral Tab 20 tabs 0 refills on 11/24/20    Per protocol, not on protocol. Rx denied. Orthopedic

## 2021-03-08 ENCOUNTER — OFFICE VISIT (OUTPATIENT)
Dept: INTERNAL MEDICINE CLINIC | Facility: CLINIC | Age: 56
End: 2021-03-08
Payer: COMMERCIAL

## 2021-03-08 VITALS
HEIGHT: 72 IN | TEMPERATURE: 97 F | BODY MASS INDEX: 28.36 KG/M2 | DIASTOLIC BLOOD PRESSURE: 76 MMHG | SYSTOLIC BLOOD PRESSURE: 110 MMHG | RESPIRATION RATE: 18 BRPM | WEIGHT: 209.38 LBS

## 2021-03-08 DIAGNOSIS — B35.6 TINEA CRURIS: Primary | ICD-10-CM

## 2021-03-08 PROCEDURE — 3074F SYST BP LT 130 MM HG: CPT | Performed by: FAMILY MEDICINE

## 2021-03-08 PROCEDURE — 99213 OFFICE O/P EST LOW 20 MIN: CPT | Performed by: FAMILY MEDICINE

## 2021-03-08 PROCEDURE — 3078F DIAST BP <80 MM HG: CPT | Performed by: FAMILY MEDICINE

## 2021-03-08 PROCEDURE — 3008F BODY MASS INDEX DOCD: CPT | Performed by: FAMILY MEDICINE

## 2021-03-08 NOTE — PROGRESS NOTES
Tyrone Grey  2/6/1965    Patient presents with:  Rash: MR rm 9 rash in groin area [id number]       HPI:   Tyrone Grey is a 64year old male who presents with reddish groin rash that has been present for about 2-3 weeks.  He has used Econazole topical in the pa or abdominal pain  NEURO: denies headaches    EXAM:   /76 (BP Location: Left arm, Patient Position: Sitting, Cuff Size: adult)   Temp 97.3 °F (36.3 °C) (Temporal)   Resp 18   Ht 6' (1.829 m)   Wt 209 lb 6.4 oz (95 kg)   BMI 28.40 kg/m²   GENERAL: Veena Hollins

## 2021-03-16 ENCOUNTER — TELEPHONE (OUTPATIENT)
Dept: INTERNAL MEDICINE CLINIC | Facility: CLINIC | Age: 56
End: 2021-03-16

## 2021-03-16 NOTE — TELEPHONE ENCOUNTER
Pt stated 1898 Rachele Gruber ordered the below script for him awhile ago but they haven't had it at the pharm and he kept checking back. Pt stated pharm has the creme, but not ointment. Or, pt wants to know if there is something else he get can. High TE Please advise.

## 2021-04-01 ENCOUNTER — OFFICE VISIT (OUTPATIENT)
Dept: INTERNAL MEDICINE CLINIC | Facility: CLINIC | Age: 56
End: 2021-04-01
Payer: COMMERCIAL

## 2021-04-01 ENCOUNTER — LAB ENCOUNTER (OUTPATIENT)
Dept: LAB | Age: 56
End: 2021-04-01
Attending: NURSE PRACTITIONER
Payer: COMMERCIAL

## 2021-04-01 VITALS
HEIGHT: 72 IN | HEART RATE: 67 BPM | DIASTOLIC BLOOD PRESSURE: 70 MMHG | BODY MASS INDEX: 28.58 KG/M2 | WEIGHT: 211 LBS | TEMPERATURE: 98 F | SYSTOLIC BLOOD PRESSURE: 112 MMHG | RESPIRATION RATE: 16 BRPM

## 2021-04-01 DIAGNOSIS — R73.03 PREDIABETES: ICD-10-CM

## 2021-04-01 DIAGNOSIS — M25.511 ACUTE PAIN OF RIGHT SHOULDER: Primary | ICD-10-CM

## 2021-04-01 DIAGNOSIS — E78.5 DYSLIPIDEMIA: ICD-10-CM

## 2021-04-01 DIAGNOSIS — Z13.29 SCREENING FOR THYROID DISORDER: ICD-10-CM

## 2021-04-01 DIAGNOSIS — Z13.0 SCREENING FOR BLOOD DISEASE: ICD-10-CM

## 2021-04-01 PROCEDURE — 36415 COLL VENOUS BLD VENIPUNCTURE: CPT | Performed by: NURSE PRACTITIONER

## 2021-04-01 PROCEDURE — 99213 OFFICE O/P EST LOW 20 MIN: CPT | Performed by: PHYSICIAN ASSISTANT

## 2021-04-01 PROCEDURE — 80050 GENERAL HEALTH PANEL: CPT | Performed by: NURSE PRACTITIONER

## 2021-04-01 PROCEDURE — 3078F DIAST BP <80 MM HG: CPT | Performed by: PHYSICIAN ASSISTANT

## 2021-04-01 PROCEDURE — 3074F SYST BP LT 130 MM HG: CPT | Performed by: PHYSICIAN ASSISTANT

## 2021-04-01 PROCEDURE — 80061 LIPID PANEL: CPT | Performed by: NURSE PRACTITIONER

## 2021-04-01 PROCEDURE — 3008F BODY MASS INDEX DOCD: CPT | Performed by: PHYSICIAN ASSISTANT

## 2021-04-01 PROCEDURE — 83036 HEMOGLOBIN GLYCOSYLATED A1C: CPT | Performed by: NURSE PRACTITIONER

## 2021-04-01 RX ORDER — HYDROCODONE BITARTRATE AND ACETAMINOPHEN 5; 325 MG/1; MG/1
1 TABLET ORAL DAILY PRN
Qty: 20 TABLET | Refills: 0 | Status: SHIPPED | OUTPATIENT
Start: 2021-04-01

## 2021-04-01 NOTE — PROGRESS NOTES
Patient presents with:  Shoulder Pain: DD Rm 2, Right Shoulder pain x 2 months,       HPI:  Pt presents with c/o recurrent R shoulder pain. Pt fell and injured it several months ago. He saw Dr. Micky Kocher had X-rays and then an injection.   His pain eventuall IR on R. Negative empty can test.  Strength 5/5 in B shoulders in all directions. Some pain with palp of R AC joint. Skin: Skin is warm and dry. No rash noted. No erythema. No pallor.        A/P:    Acute pain of right shoulder  (primary encounter diagn

## 2021-04-12 ENCOUNTER — MED REC SCAN ONLY (OUTPATIENT)
Dept: INTERNAL MEDICINE CLINIC | Facility: CLINIC | Age: 56
End: 2021-04-12

## 2021-04-30 ENCOUNTER — TELEPHONE (OUTPATIENT)
Dept: INTERNAL MEDICINE CLINIC | Facility: CLINIC | Age: 56
End: 2021-04-30

## 2021-04-30 NOTE — TELEPHONE ENCOUNTER
[facility] [place]                         Sent to scan stat for further processing   Sent copy to scanning

## 2022-09-27 NOTE — PROGRESS NOTES
Sam Victor is a 46year old male who presents for a complete physical exam.     HPI:   Pt has no complaints. Stated has been feeling well. 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 GERD (gastroesophageal reflux disease) No  Alcohol Use:  no      Concerns:  no     Health Maintenance  Immunizations: Recommend flu vaccine for fall 2017.      Immunization History  Administered            Date(s) Administered    Influenza             11/27/2012      TDAP                  11/27/ smooth, non-tender, w/o masses/nodules. No penile lesions. No hernias felt  RECTAL: normal appearance. No rectal masses. Prostate non-tender, firm, smooth, non-enlarged. MUSCULOSKELETAL: muscle strength grade 5 to all extremities, back non-tender.  Kathy Dixon Hypothyroidism

## 2023-10-18 ENCOUNTER — TELEPHONE (OUTPATIENT)
Dept: INTERNAL MEDICINE CLINIC | Facility: CLINIC | Age: 58
End: 2023-10-18

## 2023-10-18 DIAGNOSIS — Z12.5 SCREENING FOR MALIGNANT NEOPLASM OF PROSTATE: ICD-10-CM

## 2023-10-18 DIAGNOSIS — Z13.228 SCREENING FOR METABOLIC DISORDER: ICD-10-CM

## 2023-10-18 DIAGNOSIS — R73.03 PREDIABETES: ICD-10-CM

## 2023-10-18 DIAGNOSIS — Z13.29 SCREENING FOR THYROID DISORDER: ICD-10-CM

## 2023-10-18 DIAGNOSIS — Z13.220 SCREENING FOR LIPID DISORDERS: ICD-10-CM

## 2023-10-18 DIAGNOSIS — Z13.0 SCREENING FOR DISORDER OF BLOOD AND BLOOD-FORMING ORGANS: Primary | ICD-10-CM

## 2023-10-18 DIAGNOSIS — Z00.00 ROUTINE GENERAL MEDICAL EXAMINATION AT A HEALTH CARE FACILITY: ICD-10-CM

## 2023-10-18 NOTE — TELEPHONE ENCOUNTER
Future Appointments   Date Time Provider Jacques Gwen   11/30/2023  7:20 AM Dock No, APRN [facility]     Orders to [facility]- Pt informed that labs need to be completed no sooner than 2 weeks prior to the appt.  Pt aware to fast-no call back required

## 2023-10-21 ENCOUNTER — TELEPHONE (OUTPATIENT)
Dept: INTERNAL MEDICINE CLINIC | Facility: CLINIC | Age: 58
End: 2023-10-21

## 2023-10-21 NOTE — TELEPHONE ENCOUNTER
Pt paged the doctor on call stated he is positive for COVID and per TB needs a VV appt for Mon with anyone. Called pt on [phone] and wife answered stated she paged the doctor not the pt. Pt is not there with her at the moment She declined scheduling a VV stated she might take him to the UC.

## 2023-11-27 ENCOUNTER — LAB ENCOUNTER (OUTPATIENT)
Dept: LAB | Age: 58
End: 2023-11-27
Attending: NURSE PRACTITIONER
Payer: COMMERCIAL

## 2023-11-27 DIAGNOSIS — Z12.5 SCREENING FOR MALIGNANT NEOPLASM OF PROSTATE: ICD-10-CM

## 2023-11-27 DIAGNOSIS — Z13.0 SCREENING FOR DISORDER OF BLOOD AND BLOOD-FORMING ORGANS: ICD-10-CM

## 2023-11-27 DIAGNOSIS — Z13.29 SCREENING FOR THYROID DISORDER: ICD-10-CM

## 2023-11-27 DIAGNOSIS — Z13.228 SCREENING FOR METABOLIC DISORDER: ICD-10-CM

## 2023-11-27 DIAGNOSIS — Z13.220 SCREENING FOR LIPID DISORDERS: ICD-10-CM

## 2023-11-27 DIAGNOSIS — R73.03 PREDIABETES: ICD-10-CM

## 2023-11-27 DIAGNOSIS — Z00.00 ROUTINE GENERAL MEDICAL EXAMINATION AT A HEALTH CARE FACILITY: ICD-10-CM

## 2023-11-27 LAB
ALBUMIN SERPL-MCNC: 4.1 G/DL (ref 3.4–5)
ALBUMIN/GLOB SERPL: 1.2 {RATIO} (ref 1–2)
ALP LIVER SERPL-CCNC: 57 U/L
ALT SERPL-CCNC: 24 U/L
ANION GAP SERPL CALC-SCNC: 7 MMOL/L (ref 0–18)
AST SERPL-CCNC: 20 U/L (ref 15–37)
BASOPHILS # BLD AUTO: 0.07 X10(3) UL (ref 0–0.2)
BASOPHILS NFR BLD AUTO: 1.2 %
BILIRUB SERPL-MCNC: 2.6 MG/DL (ref 0.1–2)
BUN BLD-MCNC: 18 MG/DL (ref 9–23)
CALCIUM BLD-MCNC: 9.8 MG/DL (ref 8.5–10.1)
CHLORIDE SERPL-SCNC: 107 MMOL/L (ref 98–112)
CHOLEST SERPL-MCNC: 204 MG/DL (ref ?–200)
CO2 SERPL-SCNC: 27 MMOL/L (ref 21–32)
COMPLEXED PSA SERPL-MCNC: 1.45 NG/ML (ref ?–4)
CREAT BLD-MCNC: 1.07 MG/DL
EGFRCR SERPLBLD CKD-EPI 2021: 80 ML/MIN/1.73M2 (ref 60–?)
EOSINOPHIL # BLD AUTO: 0.13 X10(3) UL (ref 0–0.7)
EOSINOPHIL NFR BLD AUTO: 2.3 %
ERYTHROCYTE [DISTWIDTH] IN BLOOD BY AUTOMATED COUNT: 13.5 %
EST. AVERAGE GLUCOSE BLD GHB EST-MCNC: 126 MG/DL (ref 68–126)
FASTING PATIENT LIPID ANSWER: YES
FASTING STATUS PATIENT QL REPORTED: YES
GLOBULIN PLAS-MCNC: 3.3 G/DL (ref 2.8–4.4)
GLUCOSE BLD-MCNC: 104 MG/DL (ref 70–99)
HBA1C MFR BLD: 6 % (ref ?–5.7)
HCT VFR BLD AUTO: 46 %
HDLC SERPL-MCNC: 66 MG/DL (ref 40–59)
HGB BLD-MCNC: 15.7 G/DL
IMM GRANULOCYTES # BLD AUTO: 0.02 X10(3) UL (ref 0–1)
IMM GRANULOCYTES NFR BLD: 0.4 %
LDLC SERPL CALC-MCNC: 117 MG/DL (ref ?–100)
LYMPHOCYTES # BLD AUTO: 1.59 X10(3) UL (ref 1–4)
LYMPHOCYTES NFR BLD AUTO: 28 %
MCH RBC QN AUTO: 29.5 PG (ref 26–34)
MCHC RBC AUTO-ENTMCNC: 34.1 G/DL (ref 31–37)
MCV RBC AUTO: 86.5 FL
MONOCYTES # BLD AUTO: 0.46 X10(3) UL (ref 0.1–1)
MONOCYTES NFR BLD AUTO: 8.1 %
NEUTROPHILS # BLD AUTO: 3.41 X10 (3) UL (ref 1.5–7.7)
NEUTROPHILS # BLD AUTO: 3.41 X10(3) UL (ref 1.5–7.7)
NEUTROPHILS NFR BLD AUTO: 60 %
NONHDLC SERPL-MCNC: 138 MG/DL (ref ?–130)
OSMOLALITY SERPL CALC.SUM OF ELEC: 294 MOSM/KG (ref 275–295)
PLATELET # BLD AUTO: 189 10(3)UL (ref 150–450)
POTASSIUM SERPL-SCNC: 4 MMOL/L (ref 3.5–5.1)
PROT SERPL-MCNC: 7.4 G/DL (ref 6.4–8.2)
RBC # BLD AUTO: 5.32 X10(6)UL
SODIUM SERPL-SCNC: 141 MMOL/L (ref 136–145)
TRIGL SERPL-MCNC: 117 MG/DL (ref 30–149)
TSI SER-ACNC: 0.49 MIU/ML (ref 0.36–3.74)
VLDLC SERPL CALC-MCNC: 20 MG/DL (ref 0–30)
WBC # BLD AUTO: 5.7 X10(3) UL (ref 4–11)

## 2023-11-27 PROCEDURE — 80050 GENERAL HEALTH PANEL: CPT | Performed by: NURSE PRACTITIONER

## 2023-11-27 PROCEDURE — 80061 LIPID PANEL: CPT | Performed by: NURSE PRACTITIONER

## 2023-11-27 PROCEDURE — 84153 ASSAY OF PSA TOTAL: CPT | Performed by: NURSE PRACTITIONER

## 2023-11-27 PROCEDURE — 83036 HEMOGLOBIN GLYCOSYLATED A1C: CPT | Performed by: NURSE PRACTITIONER

## 2023-12-19 PROBLEM — I77.810 DILATED AORTIC ROOT: Status: ACTIVE | Noted: 2023-12-19

## 2023-12-19 PROBLEM — I77.810 DILATED AORTIC ROOT (HCC): Status: ACTIVE | Noted: 2023-12-19

## 2023-12-20 ENCOUNTER — OFFICE VISIT (OUTPATIENT)
Dept: INTERNAL MEDICINE CLINIC | Facility: CLINIC | Age: 58
End: 2023-12-20
Payer: COMMERCIAL

## 2023-12-20 VITALS
RESPIRATION RATE: 16 BRPM | DIASTOLIC BLOOD PRESSURE: 72 MMHG | TEMPERATURE: 97 F | HEIGHT: 72 IN | OXYGEN SATURATION: 96 % | SYSTOLIC BLOOD PRESSURE: 116 MMHG | BODY MASS INDEX: 27.95 KG/M2 | HEART RATE: 81 BPM | WEIGHT: 206.38 LBS

## 2023-12-20 DIAGNOSIS — E78.5 DYSLIPIDEMIA: ICD-10-CM

## 2023-12-20 DIAGNOSIS — Z12.11 SCREEN FOR COLON CANCER: ICD-10-CM

## 2023-12-20 DIAGNOSIS — Z00.00 ROUTINE GENERAL MEDICAL EXAMINATION AT A HEALTH CARE FACILITY: Primary | ICD-10-CM

## 2023-12-20 DIAGNOSIS — Q23.1 AORTIC INSUFFICIENCY DUE TO BICUSPID AORTIC VALVE: ICD-10-CM

## 2023-12-20 DIAGNOSIS — Q23.1 BICUSPID AORTIC VALVE DETERMINED BY IMAGING: ICD-10-CM

## 2023-12-20 DIAGNOSIS — I77.810 DILATED AORTIC ROOT (HCC): ICD-10-CM

## 2023-12-20 DIAGNOSIS — R73.03 PREDIABETES: ICD-10-CM

## 2023-12-20 PROCEDURE — 3008F BODY MASS INDEX DOCD: CPT | Performed by: NURSE PRACTITIONER

## 2023-12-20 PROCEDURE — 3078F DIAST BP <80 MM HG: CPT | Performed by: NURSE PRACTITIONER

## 2023-12-20 PROCEDURE — 3074F SYST BP LT 130 MM HG: CPT | Performed by: NURSE PRACTITIONER

## 2023-12-20 PROCEDURE — 99396 PREV VISIT EST AGE 40-64: CPT | Performed by: NURSE PRACTITIONER

## 2025-01-16 ENCOUNTER — TELEPHONE (OUTPATIENT)
Dept: INTERNAL MEDICINE CLINIC | Facility: CLINIC | Age: 60
End: 2025-01-16

## 2025-01-16 DIAGNOSIS — Z12.5 ENCOUNTER FOR SCREENING FOR MALIGNANT NEOPLASM OF PROSTATE: ICD-10-CM

## 2025-01-16 DIAGNOSIS — E78.5 DYSLIPIDEMIA: ICD-10-CM

## 2025-01-16 DIAGNOSIS — Z00.00 ROUTINE GENERAL MEDICAL EXAMINATION AT A HEALTH CARE FACILITY: Primary | ICD-10-CM

## 2025-01-16 DIAGNOSIS — R73.03 PREDIABETES: ICD-10-CM

## 2025-01-16 NOTE — TELEPHONE ENCOUNTER
Patient called request labs prior to their annual physical.  Annual physical scheduled for   Future Appointments   Date Time Provider Department Center   1/29/2025  8:30 AM Fani Sylvester APRN [facility]       Please order labs. Patient preferred lab is [facility].  Patient informed request was sent to clinical team.  Patient informed to fast for labs.  No callback required.

## 2025-01-28 NOTE — PROGRESS NOTES
Rosales Cervantes is a 59 year old male who presents for a complete physical exam.     HPI:   Pt complains of .  Did not schedule colonoscopy this past year.  He is due.  Dr Goodwin.  They will not do the colonoscopy until he has the cardiac work up completed.      AI secondary to bicuspid aortic valve. See CPE note from 12/23.  I strongly recommended ECHO and cardiology follow up  he did not see them.  Last 2013?  Dr Escamilla  I again,  frankly discussed the importance of cardiology follow up annually as well as routine ECHO   I have discussed risk of untreated / unmonitored bicuspid aortic valve including possibiltiy of valve failure.  He will call today for appt.      Prediabetes.   Due for labs. 12/23 6.0  discussed risk of DM.  He will have on his way out today.    Dyslipidemia.   Due for labs.  Will have today.     Wt Readings from Last 6 Encounters:   01/29/25 214 lb 9.6 oz (97.3 kg)   12/20/23 206 lb 6.4 oz (93.6 kg)   04/01/21 211 lb (95.7 kg)   03/08/21 209 lb 6.4 oz (95 kg)   11/10/20 203 lb 12.8 oz (92.4 kg)   10/30/20 205 lb (93 kg)       Cholesterol, Total (mg/dL)   Date Value   11/27/2023 204 (H)   04/01/2021 209 (H)   11/01/2019 217 (H)     CHOLESTEROL (mg/dL)   Date Value   12/31/2012 224 (H)     HDL Cholesterol (mg/dL)   Date Value   11/27/2023 66 (H)   04/01/2021 59   11/01/2019 55     HDL CHOL (mg/dL)   Date Value   12/31/2012 55     LDL Cholesterol (mg/dL)   Date Value   11/27/2023 117 (H)   04/01/2021 124 (H)   11/01/2019 139 (H)     LDL CHOLESTROL (mg/dL)   Date Value   12/31/2012 141 (H)     AST (U/L)   Date Value   11/27/2023 20   04/01/2021 13 (L)   11/01/2019 17   12/31/2012 18     ALT (U/L)   Date Value   11/27/2023 24   04/01/2021 26   11/01/2019 19   12/31/2012 33      No current outpatient medications on file.      Past Medical History:    AI (aortic insufficiency)    congenital bicupsid aortic valve     Elevated bilirubin      Past Surgical History:   Procedure Laterality Date    Colonoscopy   09/27/2017    repeat 5 yeras, polyp Dr. Grady Goodwin    Hernia surgery      Tonsillectomy        Family History   Problem Relation Age of Onset    Hypertension Father     Heart Disorder Paternal Grandmother     Heart Disorder Paternal Grandfather            Health Maintenance  Immunizations: discussed shingrix.     Immunization History   Administered Date(s) Administered    >=3 YRS TRI  MULTIDOSE VIAL (67767) FLU CLINIC 02/12/2018    Covid-19 Vaccine Pfizer 30 mcg/0.3 ml 02/03/2021, 02/24/2021, 12/05/2021    Covid-19 Vaccine Pfizer Bivalent 30mcg/0.3mL 12/21/2022    FLULAVAL 6 months & older 0.5 ml Prefilled syringe (33043) 10/30/2019    FLUZONE 6 months and older PFS 0.5 ml (96873) 09/18/2020, 12/21/2022, 11/26/2023    Influenza 11/27/2012    TDAP 11/27/2012, 12/31/2017, 09/16/2020    Tb Intradermal Test 10/30/2019     Dental Visits:  yes   Colon cancer screening: see above.    Health Maintenance   Topic Date Due    Colorectal Cancer Screening  09/27/2022      PSA:     Lab Results   Component Value Date    PSA 0.751 07/14/2017    PSA 0.54 12/31/2012       PSA Screen:     Lab Results   Component Value Date    PSAS 1.45 11/27/2023    PSAS 0.89 11/01/2019         REVIEW OF SYSTEMS:   GENERAL: feels well otherwise  SKIN  see below.   EYES:denies blurred vision or double vision  HEENT: denies nasal congestion, sinus pain or ear discomfort  LUNGS: denies shortness of breath with exertion  CARDIOVASCULAR: denies chest pain on exertion  GI: denies abdominal pain,denies heartburn  : denies nocturia or changes in stream  MUSCULOSKELETAL: denies back pain  NEURO: denies headaches  PSYCH: denies depression or anxiety      EXAM:   /68 (BP Location: Right arm, Patient Position: Sitting, Cuff Size: adult)   Pulse 66   Temp 97.9 °F (36.6 °C) (Temporal)   Resp 16   Ht 6' (1.829 m)   Wt 214 lb 9.6 oz (97.3 kg)   SpO2 98%   BMI 29.10 kg/m²   Body mass index is 29.1 kg/m².   GENERAL: well developed, well nourished,in  no apparent distress  SKIN: right upper back with dark area with raised darker lesion within.  HEENT: atraumatic, normocephalic,ears and throat are clear  EYES:PERRLA, EOMI, conjunctiva are clear  NECK: supple,no adenopathy,  LUNGS: clear to auscultation  CARDIO: RRR without murmur  GI: normal BS, soft, no masses, HSM or tenderness  MUSCULOSKELETAL: back is not tender  EXTREMITIES: no edema  NEURO: Oriented times three,cranial nerves are intact,motor and sensory are grossly intact    ASSESSMENT AND PLAN:   Rosales Cervantes is a 59 year old male who presents for a complete physical exam.     HEALTH MAINTENANCE: labs today.  Considering shingrix.     Encounter Diagnoses   Name Primary?    Routine general medical examination at a health care facility Yes    Aortic insufficiency due to bicuspid aortic valve (HCC)  again due for ECHO and cardiology follow up      Bicuspid aortic valve determined by imaging  last 2019  strongly encouraged completion of ECHO and cardiology follow up. Last 2013     Dilated aortic root ECHO ordered.       Dyslipidemia  check labs.      Prediabetes  check labs.  Watch diet.      Screen for colon cancer     Skin lesion  refer to derm  Dr CHARO Capps.          No orders of the defined types were placed in this encounter.      Meds & Refills for this Visit:  Requested Prescriptions      No prescriptions requested or ordered in this encounter       Imaging & Consults:  EVALUATE & TREAT, GASTRO (INTERNAL)  CARDIO - INTERNAL  CARD ECHO 2D DOPPLER (CPT=93306)    No follow-ups on file.  There are no Patient Instructions on file for this visit.

## 2025-01-29 ENCOUNTER — OFFICE VISIT (OUTPATIENT)
Dept: INTERNAL MEDICINE CLINIC | Facility: CLINIC | Age: 60
End: 2025-01-29
Payer: COMMERCIAL

## 2025-01-29 ENCOUNTER — LAB ENCOUNTER (OUTPATIENT)
Dept: LAB | Age: 60
End: 2025-01-29
Attending: NURSE PRACTITIONER
Payer: COMMERCIAL

## 2025-01-29 VITALS
BODY MASS INDEX: 29.07 KG/M2 | SYSTOLIC BLOOD PRESSURE: 110 MMHG | DIASTOLIC BLOOD PRESSURE: 68 MMHG | HEIGHT: 72 IN | OXYGEN SATURATION: 98 % | TEMPERATURE: 98 F | WEIGHT: 214.63 LBS | HEART RATE: 66 BPM | RESPIRATION RATE: 16 BRPM

## 2025-01-29 DIAGNOSIS — Z00.00 ROUTINE GENERAL MEDICAL EXAMINATION AT A HEALTH CARE FACILITY: ICD-10-CM

## 2025-01-29 DIAGNOSIS — R73.03 PREDIABETES: ICD-10-CM

## 2025-01-29 DIAGNOSIS — E78.5 DYSLIPIDEMIA: ICD-10-CM

## 2025-01-29 DIAGNOSIS — Z12.11 SCREEN FOR COLON CANCER: ICD-10-CM

## 2025-01-29 DIAGNOSIS — I77.810 DILATED AORTIC ROOT: ICD-10-CM

## 2025-01-29 DIAGNOSIS — Z00.00 ROUTINE GENERAL MEDICAL EXAMINATION AT A HEALTH CARE FACILITY: Primary | ICD-10-CM

## 2025-01-29 DIAGNOSIS — L98.9 SKIN LESION: ICD-10-CM

## 2025-01-29 DIAGNOSIS — Q23.81 BICUSPID AORTIC VALVE DETERMINED BY IMAGING: ICD-10-CM

## 2025-01-29 DIAGNOSIS — Q23.1 AORTIC INSUFFICIENCY DUE TO BICUSPID AORTIC VALVE (HCC): ICD-10-CM

## 2025-01-29 DIAGNOSIS — Z12.5 ENCOUNTER FOR SCREENING FOR MALIGNANT NEOPLASM OF PROSTATE: ICD-10-CM

## 2025-01-29 DIAGNOSIS — Q23.81 AORTIC INSUFFICIENCY DUE TO BICUSPID AORTIC VALVE (HCC): ICD-10-CM

## 2025-01-29 LAB
ALBUMIN SERPL-MCNC: 4.7 G/DL (ref 3.2–4.8)
ALBUMIN/GLOB SERPL: 2 {RATIO} (ref 1–2)
ALP LIVER SERPL-CCNC: 54 U/L
ALT SERPL-CCNC: 20 U/L
ANION GAP SERPL CALC-SCNC: 9 MMOL/L (ref 0–18)
AST SERPL-CCNC: 16 U/L (ref ?–34)
BASOPHILS # BLD AUTO: 0.06 X10(3) UL (ref 0–0.2)
BASOPHILS NFR BLD AUTO: 1.1 %
BILIRUB SERPL-MCNC: 1.5 MG/DL (ref 0.3–1.2)
BUN BLD-MCNC: 17 MG/DL (ref 9–23)
CALCIUM BLD-MCNC: 9.9 MG/DL (ref 8.7–10.6)
CHLORIDE SERPL-SCNC: 106 MMOL/L (ref 98–112)
CHOLEST SERPL-MCNC: 221 MG/DL (ref ?–200)
CO2 SERPL-SCNC: 28 MMOL/L (ref 21–32)
COMPLEXED PSA SERPL-MCNC: 1.34 NG/ML (ref ?–4)
CREAT BLD-MCNC: 1.06 MG/DL
EGFRCR SERPLBLD CKD-EPI 2021: 81 ML/MIN/1.73M2 (ref 60–?)
EOSINOPHIL # BLD AUTO: 0.16 X10(3) UL (ref 0–0.7)
EOSINOPHIL NFR BLD AUTO: 2.9 %
ERYTHROCYTE [DISTWIDTH] IN BLOOD BY AUTOMATED COUNT: 13.8 %
EST. AVERAGE GLUCOSE BLD GHB EST-MCNC: 126 MG/DL (ref 68–126)
FASTING PATIENT LIPID ANSWER: YES
FASTING STATUS PATIENT QL REPORTED: YES
GLOBULIN PLAS-MCNC: 2.4 G/DL (ref 2–3.5)
GLUCOSE BLD-MCNC: 108 MG/DL (ref 70–99)
HBA1C MFR BLD: 6 % (ref ?–5.7)
HCT VFR BLD AUTO: 47 %
HDLC SERPL-MCNC: 61 MG/DL (ref 40–59)
HGB BLD-MCNC: 15.9 G/DL
IMM GRANULOCYTES # BLD AUTO: 0.01 X10(3) UL (ref 0–1)
IMM GRANULOCYTES NFR BLD: 0.2 %
LDLC SERPL CALC-MCNC: 140 MG/DL (ref ?–100)
LYMPHOCYTES # BLD AUTO: 1.74 X10(3) UL (ref 1–4)
LYMPHOCYTES NFR BLD AUTO: 31.1 %
MCH RBC QN AUTO: 30.3 PG (ref 26–34)
MCHC RBC AUTO-ENTMCNC: 33.8 G/DL (ref 31–37)
MCV RBC AUTO: 89.5 FL
MONOCYTES # BLD AUTO: 0.5 X10(3) UL (ref 0.1–1)
MONOCYTES NFR BLD AUTO: 8.9 %
NEUTROPHILS # BLD AUTO: 3.12 X10 (3) UL (ref 1.5–7.7)
NEUTROPHILS # BLD AUTO: 3.12 X10(3) UL (ref 1.5–7.7)
NEUTROPHILS NFR BLD AUTO: 55.8 %
NONHDLC SERPL-MCNC: 160 MG/DL (ref ?–130)
OSMOLALITY SERPL CALC.SUM OF ELEC: 298 MOSM/KG (ref 275–295)
PLATELET # BLD AUTO: 200 10(3)UL (ref 150–450)
POTASSIUM SERPL-SCNC: 4.3 MMOL/L (ref 3.5–5.1)
PROT SERPL-MCNC: 7.1 G/DL (ref 5.7–8.2)
RBC # BLD AUTO: 5.25 X10(6)UL
SODIUM SERPL-SCNC: 143 MMOL/L (ref 136–145)
T3FREE SERPL-MCNC: 3.31 PG/ML (ref 2.4–4.2)
T4 FREE SERPL-MCNC: 1.3 NG/DL (ref 0.8–1.7)
TRIGL SERPL-MCNC: 114 MG/DL (ref 30–149)
TSI SER-ACNC: 0.53 UIU/ML (ref 0.55–4.78)
VLDLC SERPL CALC-MCNC: 21 MG/DL (ref 0–30)
WBC # BLD AUTO: 5.6 X10(3) UL (ref 4–11)

## 2025-01-29 PROCEDURE — 83036 HEMOGLOBIN GLYCOSYLATED A1C: CPT | Performed by: NURSE PRACTITIONER

## 2025-01-29 PROCEDURE — 84439 ASSAY OF FREE THYROXINE: CPT | Performed by: NURSE PRACTITIONER

## 2025-01-29 PROCEDURE — 84481 FREE ASSAY (FT-3): CPT | Performed by: NURSE PRACTITIONER

## 2025-01-29 PROCEDURE — 80050 GENERAL HEALTH PANEL: CPT | Performed by: NURSE PRACTITIONER

## 2025-01-29 PROCEDURE — G0103 PSA SCREENING: HCPCS | Performed by: NURSE PRACTITIONER

## 2025-01-29 PROCEDURE — 80061 LIPID PANEL: CPT | Performed by: NURSE PRACTITIONER

## 2025-02-11 ENCOUNTER — HOSPITAL ENCOUNTER (OUTPATIENT)
Dept: CV DIAGNOSTICS | Facility: HOSPITAL | Age: 60
Discharge: HOME | End: 2025-02-11
Attending: NURSE PRACTITIONER
Payer: COMMERCIAL

## 2025-02-11 DIAGNOSIS — Q23.81 BICUSPID AORTIC VALVE DETERMINED BY IMAGING: ICD-10-CM

## 2025-02-11 DIAGNOSIS — I77.810 DILATED AORTIC ROOT: ICD-10-CM

## 2025-02-11 PROCEDURE — 93306 TTE W/DOPPLER COMPLETE: CPT | Performed by: NURSE PRACTITIONER

## 2025-02-12 NOTE — PROGRESS NOTES
This visit is conducted using video and audio.  The patient consents to this service.  The patient understands and accepts financial responsibility for any deductible, co-insurance and/or co-pays associated with this service.    Time Spent: 30min      Rosales Cervantes is a 60 year old male.  No chief complaint on file.      HPI:   Video visit for follow up of labs and ECHO.  ECHO done for hx bicuspid aortic valve.  Previous ECHO 2019 with mild AI  now with mild to moderate.  I have asked him to see cardiology as his last visit was 2013.  He has appt Dr Mejia 2/27.      Hyperglycemia   with A1c 6.0  discussed prediabetes and risk of diabetes.  Not exercising.   Diet, exercise, and lifestyle modification.  He is also a sweets person so discussed eliminating.      Dyslipidemia.  family history of CAD.  Discussed recommendation for statin.  He was prescribed statin in the past many years ago but did take.  He agrees to start.  Also discussed [facility].  Will mail information to him.  He will also be seeing cardiology so can speak further regarding this.  Reviewed medication including use, side effects and follow up.  The options of treatment were discussed.  The patient verbalized understanding.        TSH low with normal Free T4 and Free T3.  No meds. Discussed results and recommendation for repeat testing prior to further w/u  or evaluation with endo.  Increased stress.  Not sleeping well.      Nocturia.  Will check UA  further discussion at f/u       Patient Active Problem List   Diagnosis    Bicuspid aortic valve determined by imaging    Aortic insufficiency due to bicuspid aortic valve (HCC)    Prediabetes    Dyslipidemia    Dilated aortic root    Nocturia    Low TSH level     Current Outpatient Medications   Medication Sig Dispense Refill    rosuvastatin 5 MG Oral Tab Take 1 tablet (5 mg total) by mouth nightly. 90 tablet 1              Allergies  Allergies[1]    REVIEW OF SYSTEMS:   GENERAL HEALTH: feels well  otherwise  RESPIRATORY: denies shortness of breath with exertion, no cough  CARDIOVASCULAR: denies chest pain on exertion, no palpatations  GI: denies abdominal pain and denies heartburn, no diarrhea or constipation    as above     MUSCULOSKELETAL:  No arthralgias or myalgias  NEURO: denies headaches,       EXAM:   Limited exam as this is a video visit.  Appropriate affect, alert and oriented x 3.  No distress.  No conversational dyspnea.  Speaking in full sentences.        ASSESSMENT AND PLAN:     Encounter Diagnoses   Name Primary?    Low TSH level  repeat TSH  further plan and discussion pending results.  Yes    Prediabetes Diet, exercise, and lifestyle modification.  As above      Bicuspid aortic valve determined by imaging  has appt with cardiology     Aortic insufficiency due to bicuspid aortic valve (HCC)     Dyslipidemia  agrees to statin.  Labs 3 mo     Nocturia  check UA  will discussed meds if he wants at next ov.         Orders Placed This Encounter   Procedures    TSH and Free T4 [E]    Lipid Panel    Comp Metabolic Panel (14)    UA/M With Culture Reflex [E]       Meds & Refills for this Visit:  Requested Prescriptions     Signed Prescriptions Disp Refills    rosuvastatin 5 MG Oral Tab 90 tablet 1     Sig: Take 1 tablet (5 mg total) by mouth nightly.       Imaging & Consults:  None      Please note that the above visit was completed using two-way, real-time interactive audio and video communication.  There are limitations of this visit as no physical exam could be performed.  Every conscious effort was taken to allow for sufficient and adequate time.  This billing visit was spent on reviewing labs, medications, radiology tests and decision making.  Appropriate medical decision-making and tests are ordered as detailed in the plan of care below.    Rosales Cervantes understands phone evaluation is not a substitute for face-to-face examination or emergency care. Patient advised to go to ER or call 911 for  worsening symptoms or acute distress.                 [1] No Known Allergies

## 2025-02-13 ENCOUNTER — TELEMEDICINE (OUTPATIENT)
Dept: INTERNAL MEDICINE CLINIC | Facility: CLINIC | Age: 60
End: 2025-02-13
Payer: COMMERCIAL

## 2025-02-13 DIAGNOSIS — Q23.81 BICUSPID AORTIC VALVE DETERMINED BY IMAGING: ICD-10-CM

## 2025-02-13 DIAGNOSIS — Q23.1 AORTIC INSUFFICIENCY DUE TO BICUSPID AORTIC VALVE (HCC): ICD-10-CM

## 2025-02-13 DIAGNOSIS — R73.03 PREDIABETES: ICD-10-CM

## 2025-02-13 DIAGNOSIS — R35.1 NOCTURIA: ICD-10-CM

## 2025-02-13 DIAGNOSIS — R79.89 LOW TSH LEVEL: Primary | ICD-10-CM

## 2025-02-13 DIAGNOSIS — Q23.81 AORTIC INSUFFICIENCY DUE TO BICUSPID AORTIC VALVE (HCC): ICD-10-CM

## 2025-02-13 DIAGNOSIS — E78.5 DYSLIPIDEMIA: ICD-10-CM

## 2025-02-13 PROCEDURE — 98006 SYNCH AUDIO-VIDEO EST MOD 30: CPT | Performed by: NURSE PRACTITIONER

## 2025-02-13 RX ORDER — ROSUVASTATIN CALCIUM 5 MG/1
5 TABLET, COATED ORAL NIGHTLY
Qty: 90 TABLET | Refills: 1 | Status: SHIPPED | OUTPATIENT
Start: 2025-02-13

## 2025-02-28 ENCOUNTER — NURSE TRIAGE (OUTPATIENT)
Dept: INTERNAL MEDICINE CLINIC | Facility: CLINIC | Age: 60
End: 2025-02-28

## 2025-02-28 NOTE — TELEPHONE ENCOUNTER
Action Requested: Summary for Provider     []  Critical Lab, Recommendations Needed  [] Need Additional Advice  []   FYI    []   Need Orders  [] Need Medications Sent to Pharmacy  []  Other     SUMMARY: Wife called regarding patient's eye symptoms. She then got patient on line. Patient reports wife noticed broken blood vessel in left eye this morning. Patient denies any pain, vision changes, light sensitivity, pus, drainage, headache. Advised patient if he wishes to be seen for evaluation, can proceed to [facility] today. Can schedule time on website, location information reviewed. He confirms understanding.     Reason for call: Eye Problem  Onset: This morning     Reason for Disposition   Patient wants to be seen    Protocols used: Eye - Red Without Pus-A-[place]

## 2025-05-15 RX ORDER — ROSUVASTATIN CALCIUM 5 MG/1
5 TABLET, COATED ORAL NIGHTLY
Qty: 90 TABLET | Refills: 3 | Status: SHIPPED | OUTPATIENT
Start: 2025-05-15

## 2025-07-07 NOTE — TELEPHONE ENCOUNTER
LM for pt at [phone] (H), wanted to make sure he received his medication, SD would like him to see Dr Anisa Robertson or other Deborah Chisholm for the issue. Asked pt to call back if any questions or concerns. 98.4 98.4

## (undated) NOTE — LETTER
12/03/19        [address]  [address] [facility] [address]      Dear Bello Rodrigues,    [address] [facility] records indicate that you have outstanding lab work and or testing that was ordered for you and has not yet been completed:  Orders Placed This Encounter      TB te

## (undated) NOTE — LETTER
12/10/20        [address] [place] [id number]      Dear Starling Boas,    [address] [facility] records indicate that you have outstanding lab work and or testing that was ordered for you and has not yet been completed:  Orders Placed This Encounter

## (undated) NOTE — ED AVS SNAPSHOT
Madalyn Kitchen   MRN: [id number]    Department:  [facility] Emergency Department   Date of Visit:  12/31/2017           Disclosure     Insurance plans vary and the physician(s) referred by the ER may not be covered by your plan.  Please contact your i tell this physician (or your personal doctor if your instructions are to return to your personal doctor) about any new or lasting problems. The primary care or specialist physician will see patients referred from the [facility] Emergency Department.  Bennett Barnes

## (undated) NOTE — Clinical Note
Dear Dr. Clari gomez for referring Mehnaz Jacobsen to the [facility] in [place]. Rakel Kraus NP